# Patient Record
Sex: FEMALE | Race: WHITE | NOT HISPANIC OR LATINO | Employment: UNEMPLOYED | URBAN - METROPOLITAN AREA
[De-identification: names, ages, dates, MRNs, and addresses within clinical notes are randomized per-mention and may not be internally consistent; named-entity substitution may affect disease eponyms.]

---

## 2022-09-01 ENCOUNTER — OFFICE VISIT (OUTPATIENT)
Dept: OBSTETRICS AND GYNECOLOGY | Age: 62
End: 2022-09-01

## 2022-09-01 ENCOUNTER — PATIENT ROUNDING (BHMG ONLY) (OUTPATIENT)
Dept: OBSTETRICS AND GYNECOLOGY | Age: 62
End: 2022-09-01

## 2022-09-01 VITALS
WEIGHT: 239 LBS | BODY MASS INDEX: 37.51 KG/M2 | HEIGHT: 67 IN | DIASTOLIC BLOOD PRESSURE: 82 MMHG | SYSTOLIC BLOOD PRESSURE: 124 MMHG

## 2022-09-01 DIAGNOSIS — N90.4 LICHEN SCLEROSUS OF FEMALE GENITALIA: Primary | ICD-10-CM

## 2022-09-01 PROBLEM — N90.89 VULVAR IRRITATION: Status: ACTIVE | Noted: 2022-09-01

## 2022-09-01 PROCEDURE — 99203 OFFICE O/P NEW LOW 30 MIN: CPT | Performed by: NURSE PRACTITIONER

## 2022-09-01 RX ORDER — LOSARTAN POTASSIUM AND HYDROCHLOROTHIAZIDE 25; 100 MG/1; MG/1
1 TABLET ORAL DAILY
COMMUNITY

## 2022-09-01 RX ORDER — FLUTICASONE PROPIONATE 0.05 %
1 CREAM (GRAM) TOPICAL 2 TIMES DAILY
COMMUNITY

## 2022-09-01 RX ORDER — SERTRALINE HYDROCHLORIDE 100 MG/1
100 TABLET, FILM COATED ORAL DAILY
COMMUNITY

## 2022-09-01 RX ORDER — BACLOFEN 20 MG/1
TABLET ORAL 3 TIMES DAILY
COMMUNITY

## 2022-09-01 RX ORDER — MELOXICAM 15 MG/1
15 TABLET ORAL DAILY
COMMUNITY

## 2022-09-01 NOTE — PROGRESS NOTES
A MY CHART MESSAGE HAS BEEN SENT TO THE PATIENT FOR [facility] ROUNDING.

## 2022-09-01 NOTE — PROGRESS NOTES
[facility]   Obstetrics and Gynecology     2022      Patient:  Susan Mg   MR#:[id number]    Office note    Chief Complaint   Patient presents with   • Gynecologic Exam     Pt has lichens sclerosis and was referred by dermatologist to get checked out       Subjective     History of Present Illness  61 y.o. female  presents for evaluation of lichen sclerosus.  She has been dealing with rashes to her bilateral breast, lower abdomen and genitals for 5 years.  It has been treated as yeast.  She has been unable to have intercourse due to fragility of genital skin.  She was referred to dermatology last month and diagnosed with lichen sclerosis.  She is now using clobetasol to breast and abdomen posterior shoulders and vulva twice daily.  She had a follow-up with Derm today and was told things are improving.  She follows up again in 3 months.  Last Pap: Unknown.  Last mammogram last year.        Relevant data reviewed:      Patient Active Problem List   Diagnosis   • Vulvar irritation       Past Medical History:   Diagnosis Date   • Endometriosis      Past Surgical History:   Procedure Laterality Date   •  SECTION     • CHOLECYSTECTOMY     • OVARIAN CYST REMOVAL     • TONSILECTOMY, ADENOIDECTOMY, BILATERAL MYRINGOTOMY AND TUBES       Obstetric History:  OB History        2    Para   1    Term   1            AB   1    Living   1       SAB   1    IAB        Ectopic        Molar        Multiple        Live Births   1               Menstrual History:     No LMP recorded (lmp unknown). Patient is postmenopausal.       # 1 - Date: , Sex: Male, Weight: None, GA: 40w0d, Delivery: , Unspecified, Apgar1: None, Apgar5: None, Living: Living, Birth Comments: None    # 2 - Date: , Sex: None, Weight: None, GA: 12w0d, Delivery: None, Apgar1: None, Apgar5: None, Living: None, Birth Comments: None    History reviewed. No pertinent family history.  Social History  "    Tobacco Use   • Smoking status: Never Smoker   • Smokeless tobacco: Never Used   Vaping Use   • Vaping Use: Never used   Substance Use Topics   • Alcohol use: Yes     Comment: socially   • Drug use: Never     Patient has no known allergies.    Current Outpatient Medications:   •  baclofen (LIORESAL) 20 MG tablet, Take  by mouth 3 (Three) Times a Day., Disp: , Rfl:   •  fluticasone (CUTIVATE) 0.05 % cream, Apply 1 application topically to the appropriate area as directed 2 (Two) Times a Day., Disp: , Rfl:   •  losartan-hydrochlorothiazide (HYZAAR) 100-25 MG per tablet, Take 1 tablet by mouth Daily., Disp: , Rfl:   •  meloxicam (MOBIC) 15 MG tablet, Take 15 mg by mouth Daily., Disp: , Rfl:   •  sertraline (ZOLOFT) 100 MG tablet, Take 100 mg by mouth Daily., Disp: , Rfl:     The following portions of the patient's history were reviewed and updated as appropriate: allergies, current medications, past family history, past medical history, past social history, past surgical history, and problem list.    Review of Systems   Constitutional: Negative for activity change, appetite change, chills, fatigue and fever.   Respiratory: Negative for cough and shortness of breath.    Cardiovascular: Negative for chest pain.   Gastrointestinal: Negative for constipation, diarrhea, nausea and vomiting.   Genitourinary: Negative for dysuria, flank pain, genital sores, hematuria, menstrual problem and vaginal bleeding.   Skin: Positive for rash.       BP Readings from Last 3 Encounters:   09/01/22 124/82      Wt Readings from Last 3 Encounters:   09/01/22 108 kg (239 lb)      BMI: Estimated body mass index is 37.43 kg/m² as calculated from the following:    Height as of this encounter: 170.2 cm (67\").    Weight as of this encounter: 108 kg (239 lb). BSA: Estimated body surface area is 2.18 meters squared as calculated from the following:    Height as of this encounter: 170.2 cm (67\").    Weight as of this encounter: 108 kg (239 " lb).    Objective   Physical Exam  Constitutional:       Appearance: Normal appearance.   HENT:      Head: Normocephalic and atraumatic.   Eyes:      Pupils: Pupils are equal, round, and reactive to light.   Pulmonary:      Effort: Pulmonary effort is normal.   Chest:       Abdominal:      General: Abdomen is flat.      Palpations: Abdomen is soft.   Genitourinary:     General: Normal vulva.      Exam position: Lithotomy position.      Labia:         Right: No rash, tenderness or lesion.         Left: No rash, tenderness or lesion.       Vagina: Erythema present.       Musculoskeletal:         General: Normal range of motion.      Cervical back: Normal range of motion.   Skin:     General: Skin is warm and dry.   Neurological:      Mental Status: She is alert.   Psychiatric:         Mood and Affect: Mood normal.         Behavior: Behavior normal.         Assessment & Plan     Diagnoses and all orders for this visit:    1. Lichen sclerosus of female genitalia (Primary)    Other orders  -     Cancel: NuSwab VG+ - Swab, Vagina    Recommend to continue clobetasol treatment as directed by dermatologist.  Follow-up in 3 months, once some elasticity has been restored to the vaginal introitus for Pap smear.    No follow-ups on file.    Luli Cloud, APRN   9/1/2022 14:20 EDT